# Patient Record
Sex: FEMALE | Race: WHITE | NOT HISPANIC OR LATINO | Employment: UNEMPLOYED | URBAN - METROPOLITAN AREA
[De-identification: names, ages, dates, MRNs, and addresses within clinical notes are randomized per-mention and may not be internally consistent; named-entity substitution may affect disease eponyms.]

---

## 2017-01-12 ENCOUNTER — TELEPHONE (OUTPATIENT)
Dept: DERMATOLOGY | Facility: CLINIC | Age: 82
End: 2017-01-12

## 2017-01-26 ENCOUNTER — INITIAL CONSULT (OUTPATIENT)
Dept: DERMATOLOGY | Facility: CLINIC | Age: 82
End: 2017-01-26
Payer: MEDICARE

## 2017-01-26 VITALS
BODY MASS INDEX: 27.48 KG/M2 | HEART RATE: 78 BPM | WEIGHT: 140 LBS | DIASTOLIC BLOOD PRESSURE: 78 MMHG | SYSTOLIC BLOOD PRESSURE: 140 MMHG | HEIGHT: 60 IN

## 2017-01-26 DIAGNOSIS — C44.619 BASAL CELL CARCINOMA, ARM, LEFT: ICD-10-CM

## 2017-01-26 DIAGNOSIS — C44.01 BASAL CELL CARCINOMA, LIP: Primary | ICD-10-CM

## 2017-01-26 PROCEDURE — 99999 PR PBB SHADOW E&M-EST. PATIENT-LVL III: CPT | Mod: PBBFAC,,, | Performed by: DERMATOLOGY

## 2017-01-26 PROCEDURE — 99213 OFFICE O/P EST LOW 20 MIN: CPT | Mod: PBBFAC | Performed by: DERMATOLOGY

## 2017-01-26 PROCEDURE — 99213 OFFICE O/P EST LOW 20 MIN: CPT | Mod: S$PBB,,, | Performed by: DERMATOLOGY

## 2017-01-26 NOTE — MR AVS SNAPSHOT
[facility] - Dermatology Surgery  [address]  [facility] [address]  Phone: [phone]  Fax: [phone]                  Miesha Acosta   2017 9:30 AM   Initial consult    Description:  Female : 1926   Provider:  Darian Aponte MD   Department:  [facility] - Dermatology Surgery           Reason for Visit     Basal Cell Carcinoma                To Do List           Goals (5 Years of Data)     None      [facility]     [facility] On [facility] Care Line -  Assistance  Registered nurses in the [facility] On Call Center provide clinical advisement, health education, appointment booking, and other advisory services.  Call for this free service at [phone].             Medications           Message regarding Medications     Verify the changes and/or additions to your medication regime listed below are the same as discussed with your clinician today.  If any of these changes or additions are incorrect, please notify your healthcare provider.             Verify that the below list of medications is an accurate representation of the medications you are currently taking.  If none reported, the list may be blank. If incorrect, please contact your healthcare provider. Carry this list with you in case of emergency.           Current Medications     atenolol (TENORMIN) 25 MG tablet Take 25 mg by mouth once daily.    biotin 300 mcg Tab Take 1 tablet by mouth once daily.    calcium citrate-vitamin D3 315-200 mg (CITRACAL+D) 315-200 mg-unit per tablet Take 1 tablet by mouth 2 (two) times daily.    ferrous sulfate 324 mg (65 mg iron) TbEC Take 325 mg by mouth once daily.    furosemide (LASIX) 20 MG tablet Take 20 mg by mouth once daily.    glucosamine-chondroitin 500-400 mg tablet Take 1 tablet by mouth once daily.    multivit-min-FA-lycopen-lutein (CENTRUM SILVER) 0.4-300-250 mg-mcg-mcg Tab Take by mouth.    pantoprazole (PROTONIX) 40 MG tablet Take 40 mg by mouth once daily.    rivaroxaban  (XARELTO) 20 mg Tab Take 10 mg by mouth once daily.    simvastatin (ZOCOR) 20 MG tablet Take 20 mg by mouth every evening.    vitamin A-vit C-vit E-zinc-Cu Tab Take by mouth.           Clinical Reference Information           Vital Signs - Last Recorded  Most recent update: 1/26/2017  9:38 AM by BENNY ESPINAL    BP Pulse Ht Wt BMI    (!) 140/78 (BP Location: Right arm, Patient Position: Sitting) 78 5' (1.524 m) 63.5 kg (140 lb) 27.34 kg/m2      Blood Pressure          Most Recent Value    BP  (!)  140/78      Allergies as of 1/26/2017     Quinine      Immunizations Administered on Date of Encounter - 1/26/2017     None      [facility] Sign-Up     Activating your [facility] account is as easy as 1-2-3!     1) Visit [email or web address], select Sign Up Now, enter this activation code and your date of birth, then select Next.  [email or web address]  Expires: 3/12/2017  9:51 AM      2) Create a username and password to use when you visit [facility] in the future and select a security question in case you lose your password and select Next.    3) Enter your e-mail address and click Sign Up!    Additional Information  If you have questions, please e-mail [email or web address] or call [phone] to talk to our [facility] staff. Remember, [facility] is NOT to be used for urgent needs. For medical emergencies, dial 911.

## 2017-01-26 NOTE — PROGRESS NOTES
ALLERGIES:  Quinine    CHIEF COMPLAINT:  This 89 y.o. female comes for evaluation for Mohs' Micrographic Surgery, Fresh Tissue Technique, for treatment of a biopsy-proven basal cell carcinoma on the left upper arm and of a biopsy-proven basal cell carcinoma on the left chin. Consultation requested by Gabby Wiley MD.    HISTORY OF PRESENT ILLNESS:   Location(s): left upper arm and left chin  Duration: two months or more  Quality: left arm lesion stings at times    Context: status post biopsies by Gabby Wiley MD; path = basal cell carcinoma on the left upper arm and basal cell carcinoma on the left chin; pathology accession #[id number], [facility]   Prior Treatment: none  See also the handwritten notes/diagrams scanned to chart for additional details.    Defibrillator: No  Pacemaker: No  Artificial heart valves: No  Artificial joints: No    REVIEW OF SYSTEMS:   General: general health fair  Skin: ha previous history of skin cancer(s); prior Mohs surgery  CV: has hypertension, has atrial fibrillation; no artificial valves  Endo: has no diabetes  Hem/Lymph: not taking prescribed anticoagulants  Allergy/Immuno: has allergies as noted above  GI: has no history of hepatitis  MS: as noted above     PAST MEDICAL HISTORY:  Past Medical History   Diagnosis Date    Allergy     Arthritis     Basal cell carcinoma     Hypertension     Squamous cell carcinoma        PAST SURGICAL HISTORY:  Past Surgical History   Procedure Laterality Date    Hysterectomy  1960    Breast lumpectomy  1970        SOCIAL HISTORY:  Dependencies: smoking status as noted below  Social History   Substance Use Topics    Smoking status: Unknown If Ever Smoked    Smokeless tobacco: None    Alcohol use No       PERTINENT MEDICATIONS:  See medications list.  Current Outpatient Prescriptions on File Prior to Visit   Medication Sig Dispense Refill    atenolol (TENORMIN) 25 MG tablet Take 25 mg by mouth once daily.      biotin 300 mcg  Tab Take 1 tablet by mouth once daily.      calcium citrate-vitamin D3 315-200 mg (CITRACAL+D) 315-200 mg-unit per tablet Take 1 tablet by mouth 2 (two) times daily.      ferrous sulfate 324 mg (65 mg iron) TbEC Take 325 mg by mouth once daily.      furosemide (LASIX) 20 MG tablet Take 20 mg by mouth once daily.      glucosamine-chondroitin 500-400 mg tablet Take 1 tablet by mouth once daily.      multivit-min-FA-lycopen-lutein (CENTRUM SILVER) 0.4-300-250 mg-mcg-mcg Tab Take by mouth.      pantoprazole (PROTONIX) 40 MG tablet Take 40 mg by mouth once daily.      rivaroxaban (XARELTO) 20 mg Tab Take 10 mg by mouth once daily.      simvastatin (ZOCOR) 20 MG tablet Take 20 mg by mouth every evening.      vitamin A-vit C-vit E-zinc-Cu Tab Take by mouth.       No current facility-administered medications on file prior to visit.        ALLERGIES:  Quinine    EXAM:  See also the handwritten notes/diagrams scanned to chart for additional details.  Constitutional  General appearance: well-developed, well-nourished, well-kempt older white female    Eyes  Inspection of conjunctivae and lids reveals no abnormalities; sclerae anicteric  Neurologic/Psychiatric  Alert,  normal orientation to time, place, person  Normal mood and affect with no evidence of depression, anxiety, agitation  Skin: see photo(s)  Head: background marked solar damage to exposed areas of skin; in addition, inspection/palpation reveals an ill-defined, approximately 6 mm pink plaque on the left lower lip which is consistent with a site of recent biopsy  Neck: examination reveals marked chronic solar damage  Left upper extremity: examination reveals an approximately 1 cm pink, somewhat scaly plaque on the left upper arm over the deltoid muscle    ASSESSMENT: biopsy-proven basal cell carcinoma of the left lower lip  biopsy-proven basal cell carcinoma of the left upper arm  chronic solar damage to areas as noted above  personal history of non-melanoma  skin cancer    PLAN:  The diagnosis and management options, and risks and benefits of the alternatives, including observation/non-treatment, radiation treatment, excision with vertical frozen section or paraffin-embedded section margin evaluation, and Mohs' Micrographic Surgery, Fresh Tissue Technique, were discussed at length with the patient. In particular, the discussion included, but was not limited to, the following:    One alternative at this point would be to defer further treatment and observe the lesion. With small skin cancers of this kind, it is possible that a biopsy can be sufficient to definitively treat a small skin cancer of this kind. Alternatively, some skin cancers are slow growing and do not require immediate treatment. The potential advantage of this choice would be to avoid the need for possibly unnecessary additional surgery. Among the potential disadvantages of this would be the possibility of enlargement of the lesion, more extensive spread of the lesion or recurrence at a later date, which might necessitate a larger and more complex surgery.    Radiation treatment can be an effective treatment for this type of skin cancer. The usual course of treatment is every weekday for several weeks. Local irritation will result from treatment, although no systemic side effects are expected. The potential advantage of radiation treatment is that it avoids the need for surgery. Among the disadvantages of radiation treatment are the length of treatment, the local inflammatory response, the absence of pathologic confirmation of the removal of the skin cancer, a possible increased risk of additional skin cancer in the treated area in later years, and a somewhat increased risk of recurrence at a later date.     Excisional surgery can be an effective treatment for this type of skin cancer. This would involve excision of the lesion with margin evaluation by submitting the specimen to a pathologist for either  immediate marginal assessment via frozen section processing, or delayed marginal assessment by fixed-tissue processing. The potential advantage of this technique is that it offers a way of treating the lesion with some degree of histologic confirmation of tumor removal. Among the disadvantages of this treatment are the possible need for re-excision if marginal involvement is identified, a somewhat greater likelihood of recurrence as compared to Mohs' surgery because of the less comprehensive margin evaluation inherent in the technique, and the general potential risks of surgery, including allergic reactions to the anesthetic and other materials used, infection, injury to nerves in the area with consequent loss of sensation or muscle function, and scarring or distortion of surrounding structures.    Mohs' surgery is a very effective treatment for this type of skin cancer. The potential advantage of Mohs' surgery is that this technique offers the greatest possible certainty of knowing that the skin cancer has been completely removed, with the removal of the least amount of normal tissue. The potential disadvantages of Mohs' surgery include the duration of the surgery, the possible need for a separate surgery for reconstruction following tumor removal, and scarring as a result. In addition, general potential risks of surgery as noted above also apply to treatment via Mohs' surgery.    Ms. Acosta is somewhat hesitant to proceed with treatment of the sites at present. After discussing options with the patient, we will defer further treatment to the sites and observe them for the present.    Sufficient time was available for questions, and all questions were answered to her satisfaction.     Discussion of the above issues constituted greater than 50% of the face-to-face encounter, the duration of which was 20 minutes.    An appointment will be made for her to follow-up for re-evaluation in 2 months. She is to call to  be seen sooner if any changes or signs of recurrence, which were reviewed, should arise in the meantime.    A consultation report will be sent to Dr. Wiley.    --------------------------------------  Note: some or all of this note may have been generated using voice recognition software and thus may contain grammatical and/or spelling errors.

## 2017-03-21 ENCOUNTER — TELEPHONE (OUTPATIENT)
Dept: DERMATOLOGY | Facility: CLINIC | Age: 82
End: 2017-03-21

## 2017-04-25 ENCOUNTER — TELEPHONE (OUTPATIENT)
Dept: DERMATOLOGY | Facility: CLINIC | Age: 82
End: 2017-04-25

## 2017-05-11 ENCOUNTER — OFFICE VISIT (OUTPATIENT)
Dept: DERMATOLOGY | Facility: CLINIC | Age: 82
End: 2017-05-11
Payer: MEDICARE

## 2017-05-11 DIAGNOSIS — C44.01 BASAL CELL CARCINOMA, LIP: Primary | ICD-10-CM

## 2017-05-11 DIAGNOSIS — I50.9 CONGESTIVE HEART FAILURE, UNSPECIFIED CONGESTIVE HEART FAILURE CHRONICITY, UNSPECIFIED CONGESTIVE HEART FAILURE TYPE: ICD-10-CM

## 2017-05-11 DIAGNOSIS — C44.619 BASAL CELL CARCINOMA OF LEFT UPPER ARM: ICD-10-CM

## 2017-05-11 PROCEDURE — 99999 PR PBB SHADOW E&M-EST. PATIENT-LVL II: CPT | Mod: PBBFAC,,, | Performed by: DERMATOLOGY

## 2017-05-11 PROCEDURE — 99214 OFFICE O/P EST MOD 30 MIN: CPT | Mod: S$PBB,,, | Performed by: DERMATOLOGY

## 2017-05-11 PROCEDURE — 99212 OFFICE O/P EST SF 10 MIN: CPT | Mod: PBBFAC | Performed by: DERMATOLOGY

## 2017-05-11 NOTE — PROGRESS NOTES
ALLERGIES:  Quinine    CHIEF COMPLAINT: followup basal cell carcinomas    The patient is accompanied to this visit by her aide.    HISTORY OF PRESENT ILLNESS:  Locations: left upper arm, left lower lip/chin  Timing: I last saw her 4 months ago    Context: Context: status post biopsies by Gabby Wiley MD; path = basal cell carcinoma on the left upper arm and basal cell carcinoma on the left chin; pathology accession #[id number], [facility]  Quality: unchanged    REVIEW OF SYSTEMS:   General: general health fair  CV: since I last saw her she has been hospitalized, apparently with heart failure    PAST MEDICAL HISTORY:  Past Medical History:   Diagnosis Date    Allergy     Arthritis     Basal cell carcinoma     Hypertension     Squamous cell carcinoma        PAST SURGICAL HISTORY:  Past Surgical History:   Procedure Laterality Date    BREAST LUMPECTOMY  1970    HYSTERECTOMY  1960        SOCIAL HISTORY:  Social History   Substance Use Topics    Smoking status: Unknown If Ever Smoked    Smokeless tobacco: None    Alcohol use No       PERTINENT MEDICATIONS:  See medications list.  Current Outpatient Prescriptions on File Prior to Visit   Medication Sig Dispense Refill    atenolol (TENORMIN) 25 MG tablet Take 25 mg by mouth once daily.      biotin 300 mcg Tab Take 1 tablet by mouth once daily.      calcium citrate-vitamin D3 315-200 mg (CITRACAL+D) 315-200 mg-unit per tablet Take 1 tablet by mouth 2 (two) times daily.      ferrous sulfate 324 mg (65 mg iron) TbEC Take 325 mg by mouth once daily.      furosemide (LASIX) 20 MG tablet Take 40 mg by mouth once daily.       glucosamine-chondroitin 500-400 mg tablet Take 1 tablet by mouth once daily.      multivit-min-FA-lycopen-lutein (CENTRUM SILVER) 0.4-300-250 mg-mcg-mcg Tab Take by mouth.      pantoprazole (PROTONIX) 40 MG tablet Take 40 mg by mouth once daily.      rivaroxaban (XARELTO) 20 mg Tab Take 10 mg by mouth once daily.       simvastatin (ZOCOR) 20 MG tablet Take 20 mg by mouth every evening.      vitamin A-vit C-vit E-zinc-Cu Tab Take by mouth.       No current facility-administered medications on file prior to visit.        EXAM:  Constitutional  General appearance: well-developed, well-nourished, well-kempt older white female    Eyes  Inspection of conjunctivae and lids reveals no abnormalities; sclerae anicteric  Neurologic/Psychiatric  Alert,  normal orientation to time, place, person  Normal mood and affect with no evidence of depression, anxiety, agitation  Skin: see prior photos  Head: background marked solar damage to exposed areas of skin; there is an approximately 6 mm area of pink papular changes and depressed scarring on her left lower lip at the site of previous biopsy, which was confirmed by reference to the photo taken at her visit in January  Neck: examination reveals marked chronic solar damage  Right upper extremity: examination reveals marked chronic solar damage  Left upper extremity: examination reveals marked chronic solar damage; on her left upper arm over the deltoid there is an approximately 1 cm pink plaque which is not noticeably changed since her last visit  Right lower extremity: Marked distal edema is present  Left lower extremity: Marked distal edema is present    ASSESSMENT:   biopsy-proven basal cell carcinoma, left lower lip; unchanged since her last visit  Biopsy-proven basal cell carcinoma, left upper arm, unchanged since her last visit  chronic solar damage to areas as noted above  personal history of non-melanoma skin cancer  Underlying congestive heart failure    PLAN:  The diagnoses and management options, and risks and benefits of the alternatives, including observation/non-treatment, radiation treatment, excision with vertical frozen section or paraffin-embedded section margin evaluation, and Mohs' Micrographic Surgery, Fresh Tissue Technique, were discussed at length with the patient. In  particular, the discussion included, but was not limited to, the following:    One alternative at this point would be to defer further treatment and observe the lesions. With small skin cancers of this kind, it is possible that a biopsy can be sufficient to definitively treat a small skin cancer of this kind. Alternatively, some skin cancers are slow growing and do not require immediate treatment. The potential advantage of this choice would be to avoid the need for possibly unnecessary additional surgery. Among the potential disadvantages of this would be the possibility of enlargement of the lesions, more extensive spread of the lesions or recurrence at a later date, which might necessitate larger and more complex surgeries.    Radiation treatment can be an effective treatment for these types of skin cancer. The usual course of treatment is every weekday for several weeks. Local irritation will result from treatment, although no systemic side effects are expected. The potential advantage of radiation treatment is that it avoids the need for surgery. Among the disadvantages of radiation treatment are the length of treatment, the local inflammatory response, the absence of pathologic confirmation of the removal of the skin cancer, a possible increased risk of additional skin cancer in the treated area in later years, and a somewhat increased risk of recurrence at a later date.     Excisional surgery can be an effective treatment for these types of skin cancer. This would involve excision of the lesions with margin evaluation by submitting the specimens to a pathologist for either immediate marginal assessment via frozen section processing, or delayed marginal assessment by fixed-tissue processing. The potential advantage of this technique is that it offers a way of treating the lesions with some degree of histologic confirmation of tumor removal. Among the disadvantages of this treatment are the possible need for  re-excision if marginal involvement is identified, a somewhat greater likelihood of recurrence as compared to Mohs' surgery because of the less comprehensive margin evaluation inherent in the technique, and the general potential risks of surgery, including allergic reactions to the anesthetic and other materials used, infection, injury to nerves in the area with consequent loss of sensation or muscle function, and scarring or distortion of surrounding structures.    Mohs' surgery is a very effective treatment for these types of skin cancer. The potential advantage of Mohs' surgery is that this technique offers the greatest possible certainty of knowing that the skin cancer has been completely removed, with the removal of the least amount of normal tissue. The potential disadvantages of Mohs' surgery include the duration of the surgery, the possible need for a separate surgery for reconstruction following tumor removal, and scarring as a result. In addition, general potential risks of surgery as noted above also apply to treatment via Mohs' surgery.    Sufficient time was available for questions, and all questions were answered to her satisfaction. She fully understands the aims, risks, benefits and alternatives for treatment.  However, she is unwilling to proceed with any treatment at this time given her other medical problems and the asymptomatic nature of these lesions.  After discussing the options, we will defer further treatment for the time being.  She is to follow-up with me in 3 months, or sooner in the event that they change in the meantime, for reassessment.    In addition, I had an extended discussion with her regarding possible causes of congestive heart failure and answering her questions regarding this for her.  Greater than 50% of a 25 minute face-to-face encounter was spent counseling the patient regarding the above, discussing the possible causes for and the pathophysiology of congestive heart  failure, and answering her questions in total.    --------------------------------------  Note: some or all of this note may have been generated using voice recognition software and thus may contain grammatical and/or spelling errors.

## 2017-07-24 ENCOUNTER — TELEPHONE (OUTPATIENT)
Dept: DERMATOLOGY | Facility: CLINIC | Age: 82
End: 2017-07-24

## 2017-07-24 NOTE — TELEPHONE ENCOUNTER
----- Message from Leigh Fontenot sent at 7/24/2017  2:27 PM CDT -----  Contact: Jolie, caretaker  Jolie is calling to cancel appt scheduled for 8/10/17 and can be reached at [phone].    Thank you

## 2017-07-24 NOTE — TELEPHONE ENCOUNTER
Spoke to Ms Dave, Ms Acosta's caregiver, and she stated that she would like to cancel Ms Acosta f/u appt scheduled on 8/10/17 and isn't interested in rescheduling because she has other health issues going on right now.  I informed Ms Dave that I will cancel the appt and if Ms Acosta has any problems or concerns to please call us. Ms Dave verbally understood.

## 2017-11-08 ENCOUNTER — TELEPHONE (OUTPATIENT)
Dept: DERMATOLOGY | Facility: CLINIC | Age: 82
End: 2017-11-08

## 2017-11-08 NOTE — TELEPHONE ENCOUNTER
----- Message from Darian Aponte MD sent at 10/23/2017  4:23 PM CDT -----  Tori,   Ask me please about Ms. Acosta. I probably need to see about getting her back in to see us for a follow-up visit.   Thanks.

## 2018-01-30 ENCOUNTER — TELEPHONE (OUTPATIENT)
Dept: DERMATOLOGY | Facility: CLINIC | Age: 83
End: 2018-01-30

## 2018-01-30 NOTE — TELEPHONE ENCOUNTER
----- Message from Bree May sent at 1/30/2018  2:14 PM CST -----  Contact: patient   Please call above patient needs to schedule an appointment

## 2018-02-01 ENCOUNTER — OFFICE VISIT (OUTPATIENT)
Dept: DERMATOLOGY | Facility: CLINIC | Age: 83
End: 2018-02-01
Payer: MEDICARE

## 2018-02-01 DIAGNOSIS — C44.41 BASAL CELL CARCINOMA OF NECK: Primary | ICD-10-CM

## 2018-02-01 DIAGNOSIS — D48.5 NEOPLASM OF UNCERTAIN BEHAVIOR OF SKIN: ICD-10-CM

## 2018-02-01 PROCEDURE — 99999 PR PBB SHADOW E&M-EST. PATIENT-LVL III: CPT | Mod: PBBFAC,,, | Performed by: DERMATOLOGY

## 2018-02-01 PROCEDURE — 99213 OFFICE O/P EST LOW 20 MIN: CPT | Mod: PBBFAC | Performed by: DERMATOLOGY

## 2018-02-01 PROCEDURE — 1159F MED LIST DOCD IN RCRD: CPT | Mod: ,,, | Performed by: DERMATOLOGY

## 2018-02-01 PROCEDURE — 1126F AMNT PAIN NOTED NONE PRSNT: CPT | Mod: ,,, | Performed by: DERMATOLOGY

## 2018-02-01 PROCEDURE — 99214 OFFICE O/P EST MOD 30 MIN: CPT | Mod: S$PBB,,, | Performed by: DERMATOLOGY

## 2018-02-01 NOTE — PROGRESS NOTES
ALLERGIES:  Quinine    CHIEF COMPLAINT: followup skin cancers    The patient is accompanied to this visit by her aide.    HISTORY OF PRESENT ILLNESS:  Locations: left upper arm, left lower lip   Context: see previous notes; I saw Ms. Acosta in consultation on 7/21/16 after referral by Dr. Jordyn Montenegro regarding possible treatment via Mohs' surgery of the squamous cell carcinoma on the right forehead and of a basal cell carcinoma right neck; pathology accession #[id number], [facility]; treatment was deferred in light of the absence of significant evidence of residual skin cancer at these sites; I saw her again on 1/26/2017 after referral by Gabby Wiley MD regarding possible treatment via Mohs' surgery of basal cell carcinomas on the left upper arm and left chin, pathology accession #[id number], [facility]; treatment was again deferred given the absence of significant evidence of residual skin cancer at the sites  She recently received a letter from Dr. Montenegro following up on the squamous cell carcinoma on the right forehead  Timing: I last saw her in May, 2017    REVIEW OF SYSTEMS:   General: general health fair to poor  Resp: has she apparently has bilateral pleural effusions, possibly secondary to heart failure, for which she has undergone repeated taps  Skin: she has scattered rough growths none particularly symptomatic    PAST MEDICAL HISTORY:  Past Medical History:   Diagnosis Date    Allergy     Arthritis     Basal cell carcinoma     Hypertension     Squamous cell carcinoma        PAST SURGICAL HISTORY:  Past Surgical History:   Procedure Laterality Date    BREAST LUMPECTOMY  1970    HYSTERECTOMY  1960        SOCIAL HISTORY:  Social History   Substance Use Topics    Smoking status: Unknown If Ever Smoked    Smokeless tobacco: Not on file    Alcohol use No       PERTINENT MEDICATIONS:  See medications list.  Current Outpatient Prescriptions on File Prior to Visit    Medication Sig Dispense Refill    atenolol (TENORMIN) 25 MG tablet Take 25 mg by mouth once daily.      biotin 300 mcg Tab Take 1 tablet by mouth once daily.      calcium citrate-vitamin D3 315-200 mg (CITRACAL+D) 315-200 mg-unit per tablet Take 1 tablet by mouth 2 (two) times daily.      ferrous sulfate 324 mg (65 mg iron) TbEC Take 325 mg by mouth once daily.      furosemide (LASIX) 20 MG tablet Take 40 mg by mouth once daily.       glucosamine-chondroitin 500-400 mg tablet Take 1 tablet by mouth once daily.      multivit-min-FA-lycopen-lutein (CENTRUM SILVER) 0.4-300-250 mg-mcg-mcg Tab Take by mouth.      pantoprazole (PROTONIX) 40 MG tablet Take 40 mg by mouth once daily.      rivaroxaban (XARELTO) 20 mg Tab Take 10 mg by mouth once daily.      simvastatin (ZOCOR) 20 MG tablet Take 20 mg by mouth every evening.      vitamin A-vit C-vit E-zinc-Cu Tab Take by mouth.       No current facility-administered medications on file prior to visit.                                EXAM:  See photos above  Constitutional  General appearance: well-developed, well-nourished, well-kempt older white female    Eyes  Inspection of conjunctivae and lids reveals no abnormalities; sclerae anicteric  Neurologic/Psychiatric  Alert,  normal orientation to time, place, person  Normal mood and affect with no evidence of depression, anxiety, agitation  Skin: see photo(s)  Face: there is a small eschar on her right nose; and a crusted nodule on her right chin, which are clinically suggestive of areas of basal cell carcinoma   There is no notable evidence of residual basal cell carcinoma to the previously-biopsied site on her left lower lip    There is some slight erythema and scale to the site of previous biopsy on her right forehead, but no notable evidence of residual squamous cell carcinoma  Neck: There is a pink, pearly tumor on the right neck overlying the sternocleidomastoid muscle which is consistent with recurrent nasal  cell carcinoma  Left arm: there is no evidence of residual basal cell carcinoma at the site of previous biopsy on her left deltoid region    ASSESSMENT:  Probable basal cell carcinoma, right chin, right nose  Probable recurrent basal cell carcinoma, right neck  No evidence of residual basal cell carcinoma, left lower lip  No evidence of residual basal cell carcinoma, left arm deltoid area  No evidence of residual invasive squamous cell carcinoma, right forehead    PLAN:    I again discussed with the patient the diagnoses and management options, and risks, benefits and alternatives.   We also discussed her current underlying medical problems, with her apparent heart failure and ongoing pleural effusions.  She apparently has not been given any expectation as to her short-term or long-term health.  After discussing the alternatives, at this point we will again defer treatment of the lesions present at this time.   She has agreed to follow-up with me in two months, or sooner in the event that any changes arise to the areas in the meantime.  Sufficient time was available for questions, and all questions were answered to her satisfaction.   Discussion of the above issues constituted greater than 50% of the face-to-face encounter, the duration of which was 30 minutes.  An appointment will be made for her to follow-up for re-evaluation in 2 months. She is to call to be seen sooner if any changes or signs of recurrence, which were reviewed, should arise in the meantime.  --------------------------------------  Note: Some or all of this note may have been generated using voice recognition software. There may be voice recognition errors including grammatical and/or spelling errors found in the text. Attempts were made to correct these errors prior to signature.

## 2018-04-05 ENCOUNTER — TELEPHONE (OUTPATIENT)
Dept: DERMATOLOGY | Facility: CLINIC | Age: 83
End: 2018-04-05

## 2018-05-11 ENCOUNTER — TELEPHONE (OUTPATIENT)
Dept: DERMATOLOGY | Facility: CLINIC | Age: 83
End: 2018-05-11

## 2018-05-11 NOTE — TELEPHONE ENCOUNTER
----- Message from Buck Pina sent at 5/11/2018  9:57 AM CDT -----  Contact: Patient   Patient Requesting Appointment.     Reason:requesting reschedule from missing 4/5 appt   Name of Caller: patient   Symptoms: spot check   Communication Preference: [phone]  Additional Information:

## 2018-05-15 ENCOUNTER — TELEPHONE (OUTPATIENT)
Dept: DERMATOLOGY | Facility: CLINIC | Age: 83
End: 2018-05-15

## 2018-05-15 NOTE — TELEPHONE ENCOUNTER
----- Message from Buck Pina sent at 5/15/2018  8:19 AM CDT -----  Contact: Patient   Pt requesting to reschedule appt with provider that was missed on 4/5/18. Please contact pt at [phone].

## 2018-05-28 ENCOUNTER — OFFICE VISIT (OUTPATIENT)
Dept: DERMATOLOGY | Facility: CLINIC | Age: 83
End: 2018-05-28
Payer: MEDICARE

## 2018-05-28 DIAGNOSIS — C44.529 SQUAMOUS CELL CARCINOMA OF TRUNK: ICD-10-CM

## 2018-05-28 DIAGNOSIS — C44.319 BASAL CELL CARCINOMA (BCC) OF CHIN: ICD-10-CM

## 2018-05-28 DIAGNOSIS — C44.01 BASAL CELL CARCINOMA, LIP: Primary | ICD-10-CM

## 2018-05-28 DIAGNOSIS — D48.5 NEOPLASM OF UNCERTAIN BEHAVIOR OF SKIN: ICD-10-CM

## 2018-05-28 DIAGNOSIS — C44.41 BASAL CELL CARCINOMA (BCC) OF RIGHT SIDE OF NECK: ICD-10-CM

## 2018-05-28 PROCEDURE — 99499 UNLISTED E&M SERVICE: CPT | Mod: S$PBB,,, | Performed by: DERMATOLOGY

## 2018-05-28 PROCEDURE — 88305 TISSUE EXAM BY PATHOLOGIST: CPT | Performed by: PATHOLOGY

## 2018-05-28 PROCEDURE — 99999 PR PBB SHADOW E&M-EST. PATIENT-LVL II: CPT | Mod: PBBFAC,,, | Performed by: DERMATOLOGY

## 2018-05-28 PROCEDURE — 17262 DSTRJ MAL LES T/A/L 1.1-2.0: CPT | Mod: S$PBB,,, | Performed by: DERMATOLOGY

## 2018-05-28 PROCEDURE — 99212 OFFICE O/P EST SF 10 MIN: CPT | Mod: PBBFAC | Performed by: DERMATOLOGY

## 2018-05-28 PROCEDURE — 17262 DSTRJ MAL LES T/A/L 1.1-2.0: CPT | Mod: PBBFAC | Performed by: DERMATOLOGY

## 2018-05-28 NOTE — PROGRESS NOTES
"ALLERGIES:  Quinine    CHIEF COMPLAINT: bothersome skin growth    HISTORY OF PRESENT ILLNESS:  Location: upper back   Duration: a couple of months or more  Quality: bothersome  Context: see the quote below from my last note in February:  " I saw Ms. Acosta in consultation on 7/21/16 after referral by Dr. Jordyn Montenegro regarding possible treatment via Mohs' surgery of the squamous cell carcinoma on the right forehead and of a basal cell carcinoma right neck; pathology accession #[id number], [facility]; treatment was deferred in light of the absence of significant evidence of residual skin cancer at these sites; I saw her again on 1/26/2017 after referral by Gabby Wiley MD regarding possible treatment via Mohs' surgery of basal cell carcinomas on the left upper arm and left chin, pathology accession #[id number], [facility]; treatment was again deferred given the absence of significant evidence of residual skin cancer at the sites"    REVIEW OF SYSTEMS:  General: general health good  Skin: see previous notes  CV: has no pacemaker/no defibrillator  Apparently has bilateral heart failure; now has right and left indwelling catheters in her pleural spaces; undergoes daily drainage      PAST MEDICAL HISTORY:  Past Medical History:   Diagnosis Date    Allergy     Arthritis     Basal cell carcinoma     Hypertension     Squamous cell carcinoma        PAST SURGICAL HISTORY:  Past Surgical History:   Procedure Laterality Date    BREAST LUMPECTOMY  1970    HYSTERECTOMY  1960        SOCIAL HISTORY:  Social History   Substance Use Topics    Smoking status: Unknown If Ever Smoked    Smokeless tobacco: Not on file    Alcohol use No       PERTINENT MEDICATIONS:    Current Outpatient Prescriptions:     atenolol (TENORMIN) 25 MG tablet, Take 25 mg by mouth once daily., Disp: , Rfl:     biotin 300 mcg Tab, Take 1 tablet by mouth once daily., Disp: , Rfl:     calcium citrate-vitamin D3 " 315-200 mg (CITRACAL+D) 315-200 mg-unit per tablet, Take 1 tablet by mouth 2 (two) times daily., Disp: , Rfl:     ferrous sulfate 324 mg (65 mg iron) TbEC, Take 325 mg by mouth once daily., Disp: , Rfl:     furosemide (LASIX) 20 MG tablet, Take 40 mg by mouth once daily. , Disp: , Rfl:     glucosamine-chondroitin 500-400 mg tablet, Take 1 tablet by mouth once daily., Disp: , Rfl:     multivit-min-FA-lycopen-lutein (CENTRUM SILVER) 0.4-300-250 mg-mcg-mcg Tab, Take by mouth., Disp: , Rfl:     pantoprazole (PROTONIX) 40 MG tablet, Take 40 mg by mouth once daily., Disp: , Rfl:     rivaroxaban (XARELTO) 20 mg Tab, Take 10 mg by mouth once daily., Disp: , Rfl:     simvastatin (ZOCOR) 20 MG tablet, Take 20 mg by mouth every evening., Disp: , Rfl:     vitamin A-vit C-vit E-zinc-Cu Tab, Take by mouth., Disp: , Rfl:     ALLERGIES:  Quinine    EXAM:  Constitutional  - General appearance: well-developed, well-nourished, well-kempt elderly white female    Eyes  - Conjunctivae and lids - no abnormalities; sclerae anicteric  Neurologic/Psychiatric  - Orientation - Alert,  normal orientation to time, place, person  - Mood and affect - Alivia mood and affect with no evidence of depression, anxiety, agitation  Skin: see photo(s) below  - Head/Face: marked chronic solar damage; there are persistent nodules on the right chin and left upper lip/lateral commissure; see prior notes   - Neck: marked  chronic solar damage; there is a persistent nodule on her right neck; see prior notes  - Right Upper Extremity: marked chronic solar damage  - Left Upper Extremity: marked chronic solar damage; there is a well-healed scar with no evidence of basal cell carcinoma on the left deltoid area  - Back: there is a 1.5 cm somewhat crateriform tumor to her upper back; see photo;                           ASSESSMENT:   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  Will proceed with biopsy and destruction of upper back lesion  Also discussed options for the right neck, right chin, and left lip lesion, including Mohs surgery  See procedure note(s) below.   PROCEDURE NOTE: SHAVE BIOPSY AND DESTRUCTION    The diagnosis and management options and risk, benefits and alternatives were discussed with the patient. All questions were answered. Verbal consent was obtained.    Site: upper back  Indication: clinically suspicious lesion; suggestive of malignancy  Size: 1.5 cm  Prep: Alcohol  Anesthesia: 1% lidocaine plain, less than 2 mL  Shave biopsy performed  Electrodesiccation and curettage carried out for destruction, 3 repetitions  Hemostasis with electrodesiccation  Dressed with petrolatum and bandaid  Specimen placed in formalin to be submitted to pathology    Routine care instructions given  Followup: 2 weeks to discuss path and coordinate further care  --------------------------------------  Note: Some or all of this note may have been generated using voice recognition software. There may be voice recognition errors including grammatical and/or spelling errors found in the text. Attempts were made to correct these errors prior to signature. .

## 2018-06-08 ENCOUNTER — TELEPHONE (OUTPATIENT)
Dept: DERMATOLOGY | Facility: CLINIC | Age: 83
End: 2018-06-08

## 2018-06-11 ENCOUNTER — OFFICE VISIT (OUTPATIENT)
Dept: DERMATOLOGY | Facility: CLINIC | Age: 83
End: 2018-06-11
Payer: MEDICARE

## 2018-06-11 DIAGNOSIS — C44.319 BASAL CELL CARCINOMA (BCC) OF CHIN: Primary | ICD-10-CM

## 2018-06-11 DIAGNOSIS — C44.41 BASAL CELL CARCINOMA OF NECK: ICD-10-CM

## 2018-06-11 PROCEDURE — 99213 OFFICE O/P EST LOW 20 MIN: CPT | Mod: PBBFAC | Performed by: DERMATOLOGY

## 2018-06-11 PROCEDURE — 99214 OFFICE O/P EST MOD 30 MIN: CPT | Mod: 24,S$PBB,, | Performed by: DERMATOLOGY

## 2018-06-11 PROCEDURE — 99999 PR PBB SHADOW E&M-EST. PATIENT-LVL III: CPT | Mod: PBBFAC,,, | Performed by: DERMATOLOGY

## 2018-06-11 NOTE — PROGRESS NOTES
ALLERGIES:  Quinine    CHIEF COMPLAINT: followup post biopsy and destruction    HISTORY OF PRESENT ILLNESS:  Location: upper back  Timing: one week ago   Context: pathology showed keratoacanthoma-type squamous cell carcinoma; see pathology report in chart  Quality: somewhat tender    FINAL PATHOLOGIC DIAGNOSIS  1. Skin, upper back, shave biopsy:  - INVASIVE SQUAMOUS CELL CARCINOMA, WELL-DIFFERENTIATED, CRATERIFORM  (KERATOACANTHOMATOUS TYPE).  - THE TUMOR EXTENDS TO THE DEEP BIOPSY MARGIN.  MICROSCOPIC DESCRIPTION: Sections show a crateriform squamous proliferation exhibiting minimal atypia.  Diagnosed by: Jerald Vivar M.D.  (Electronically Signed: 2018-06-04 13:25:53)    REVIEW OF SYSTEMS:   General: general health fair  Skin: still pending treatment of basal cell carcinomas on the right neck and right chin; see previous notes; these are now symptomatic  CV: has no pacemaker, no defibrillator, no artificial valves; on Xarelto  Resp: has bilateral chest tubes and pleural effusions; gets drained about every other day  Hem/Lymph: taking prescribed anticoagulants as noted above  Allergy/Immuno: has allergies as noted above    PAST MEDICAL HISTORY:  Past Medical History:   Diagnosis Date    Allergy     Arthritis     Basal cell carcinoma     Hypertension     Squamous cell carcinoma        PAST SURGICAL HISTORY:  Past Surgical History:   Procedure Laterality Date    BREAST LUMPECTOMY  1970    HYSTERECTOMY  1960       SOCIAL HISTORY:  Social History   Substance Use Topics    Smoking status: Unknown If Ever Smoked    Smokeless tobacco: Not on file    Alcohol use No        PERTINENT MEDICATIONS:  See medications list.    Current Outpatient Prescriptions:     acetaminophen (TYLENOL) 500 MG tablet, Take 500 mg by mouth every 6 (six) hours as needed for Pain., Disp: , Rfl:     atenolol (TENORMIN) 25 MG tablet, Take 25 mg by mouth once daily., Disp: , Rfl:     azithromycin (Z-PAULINE) 250 MG tablet, Take 250 mg by  mouth once daily., Disp: , Rfl:     biotin 5,000 mcg TbDL, Take 1 tablet by mouth once daily., Disp: , Rfl:     calcium citrate-vitamin D3 315-200 mg (CITRACAL+D) 315-200 mg-unit per tablet, Take 1 tablet by mouth 2 (two) times daily., Disp: , Rfl:     cyanocobalamin (VITAMIN B-12) 1000 MCG tablet, Take 100 mcg by mouth once daily., Disp: , Rfl:     ferrous sulfate 324 mg (65 mg iron) TbEC, Take 325 mg by mouth once daily., Disp: , Rfl:     furosemide (LASIX) 20 MG tablet, Take 60 mg by mouth once daily. , Disp: , Rfl:     multivit-min-FA-lycopen-lutein (CENTRUM SILVER) 0.4-300-250 mg-mcg-mcg Tab, Take by mouth., Disp: , Rfl:     pantoprazole (PROTONIX) 40 MG tablet, Take 40 mg by mouth once daily., Disp: , Rfl:     rifAMpin (RIFADIN) 300 MG capsule, Take 300 mg by mouth once daily., Disp: , Rfl:     rivaroxaban (XARELTO) 20 mg Tab, Take 15 mg by mouth once daily. , Disp: , Rfl:     simvastatin (ZOCOR) 20 MG tablet, Take 20 mg by mouth every evening., Disp: , Rfl:     spironolactone (ALDACTONE) 25 MG tablet, Take 25 mg by mouth once daily., Disp: , Rfl:     traMADol (ULTRAM) 50 mg tablet, Take 50 mg by mouth every 6 (six) hours as needed for Pain., Disp: , Rfl:     vit C,Y-Qx-rnrfg-lutein-zeaxan (PRESERVISION AREDS 2) 557-538-71-1 mg-unit-mg-mg Cap, Take by mouth., Disp: , Rfl:     vitamin A-vit C-vit E-zinc-Cu Tab, Take by mouth., Disp: , Rfl:     EXAM:  Constitutional  General appearance: well-developed, well-nourished, well-kempt older white female    Eyes  Inspection of conjunctivae and lids reveals no abnormalities; sclerae anicteric  Neurologic/Psychiatric  Alert,  normal orientation to time, place, person  Normal mood andaffect with no evidence of depression, anxiety, agitation  Skin: see photo(s) from previous visits  Head: background marked solar damage to exposed areas of skin; in addition, inspection/palpation reveals an approximately 9-10 mm pearly, crusted tumor on the right chin which  feels freely movable over the underlying tissues on palpation  Neck: on her right neck inferior/posterior to the earlobe there is an approximately 9mm pearly, crusted tumor   Back: there is an approximately 1 cm ulceration to the site of recent treatment on her upper back    ASSESSMENT:   status post biopsy and destruction, keratoacanthoma-type squamous cell carcinoma, upper back  basal cell carcinoma on right chin and right neck, pending treatment  chronic solar damage to areas as noted above  Long term current use of anticoagulant    PLAN:    The diagnosis of the keratoacanthoma-type squamous cell carcinoma on her back was discussed. The lesion was treated by electrodesiccation and curettage at the time of the biopsy. Under the circumstances, I feel that this is likely to have been definitive treatment for this lesion. We discussed care. I will observe it for now.    The diagnoses of the basal cell carcinomas on the right neck and right chin and management options, and risks and benefits of the alternatives, including observation/non-treatment, radiation treatment, excision with vertical frozen section or paraffin-embedded section margin evaluation, and Mohs' Micrographic Surgery, Fresh Tissue Technique, were discussed at length with the patient. In particular, the discussion included, but was not limited to, the following:    One alternative at this point would be to defer further treatment and observe the lesions. With small skin cancers of this kind, it is possible that a biopsy can be sufficient to definitively treat a small skin cancer of this kind. Alternatively, some skin cancers are slow growing and do not require immediate treatment. The potential advantage of this choice would be to avoid the need for possibly unnecessary additional surgery. Among the potential disadvantages of this would be the possibility of enlargement of the lesions, more extensive spread of the lesions or recurrence at a later date,  which might necessitate larger and more complex surgeries.    Radiation treatment can be an effective treatment for these types of skin cancer. The usual course of treatment is every weekday for several weeks. Local irritation will result from treatment, although no systemic side effects are expected. The potential advantage of radiation treatment is that it avoids the need for surgery. Among the disadvantages of radiation treatment are the length of treatment, the local inflammatory response, the absence of pathologic confirmation of the removal of the skin cancer, a possible increased risk of additional skin cancer in the treated area in later years, and a somewhat increased risk of recurrence at a later date.     Excisional surgery can be an effective treatment for these types of skin cancer. This would involve excision of the lesions with margin evaluation by submitting the specimens to a pathologist for either immediate marginal assessment via frozen section processing, or delayed marginal assessment by fixed-tissue processing. The potential advantage of this technique is that it offers a way of treating the lesions with some degree of histologic confirmation of tumor removal. Among the disadvantages of this treatment are the possible need for re-excision if marginal involvement is identified, a somewhat greater likelihood of recurrence as compared to Mohs' surgery because of the less comprehensive margin evaluation inherent in the technique, and the general potential risks of surgery, including allergic reactions to the anesthetic and other materials used, infection, injury to nerves in the area with consequent loss of sensation or muscle function, and scarring or distortion of surrounding structures.    Mohs' surgery is a very effective treatment for these types of skin cancer. The potential advantage of Mohs' surgery is that this technique offers the greatest possible certainty of knowing that the skin  cancer has been completely removed, with the removal of the least amount of normal tissue. The potential disadvantages of Mohs' surgery include the duration of the surgery, the possible need for a separate surgery for reconstruction following tumor removal, and scarring as a result. In addition, general potential risks of surgery as noted above also apply to treatment via Mohs' surgery.    In light of the nature of these tumors and the locations in areas of increased risk of recurrence, Mohs' micrographic surgery was thought to be the most appropriate management choice, and these diagnoses are appropriate for treatment by Mohs' micrographic surgery.     Sufficient time was available for questions, and all questions were answered to her satisfaction. She fully understands the aims, risks, alternatives, and possible complications, and has elected to proceed with the surgery, and verbally consented to do so. The procedure will be scheduled in the near future. We will coordinate with the home health nurse to try to schedule her surgery on a day that will not interfere with the draining of her pleural effusions.    Discussion of the above issues constituted greater than 50% of the face-to-face encounter, the duration of which was 30 minutes.     Routine pre-op instructions were given to her.    The patient was instructed to continue Eliquis prior to surgery.    --------------------------------------  Note: Some or all of this note may have been generated using voice recognition software. There may be voice recognition errors including grammatical and/or spelling errors found in the text. Attempts were made to correct these errors prior to signature.

## 2018-06-12 ENCOUNTER — TELEPHONE (OUTPATIENT)
Dept: DERMATOLOGY | Facility: CLINIC | Age: 83
End: 2018-06-12

## 2018-06-12 RX ORDER — TRAMADOL HYDROCHLORIDE 50 MG/1
50 TABLET ORAL EVERY 6 HOURS PRN
COMMUNITY

## 2018-06-12 RX ORDER — LANOLIN ALCOHOL/MO/W.PET/CERES
100 CREAM (GRAM) TOPICAL DAILY
COMMUNITY

## 2018-06-12 RX ORDER — AZITHROMYCIN 250 MG/1
250 TABLET, FILM COATED ORAL DAILY
COMMUNITY

## 2018-06-12 RX ORDER — RIFAMPIN 300 MG/1
300 CAPSULE ORAL DAILY
COMMUNITY

## 2018-06-12 RX ORDER — ACETAMINOPHEN 500 MG
500 TABLET ORAL EVERY 6 HOURS PRN
COMMUNITY

## 2018-06-12 RX ORDER — SPIRONOLACTONE 25 MG/1
25 TABLET ORAL DAILY
COMMUNITY

## 2018-06-12 NOTE — TELEPHONE ENCOUNTER
----- Message from Bree Murphy sent at 6/12/2018 12:56 PM CDT -----  Contact: self  Patient [phone] is asking to please call her home and talk to the person that answers the phone to schedule appt or procedure/patient was at a luncheon and did not have any information in front of her but whoever answers at home will be able to help nurse/please call

## 2018-06-25 ENCOUNTER — TELEPHONE (OUTPATIENT)
Dept: DERMATOLOGY | Facility: CLINIC | Age: 83
End: 2018-06-25

## 2018-06-25 NOTE — TELEPHONE ENCOUNTER
spoke to patient's helper and rescheduled surgery. will check with Dr to see if can offer later time in the morning.

## 2018-07-16 ENCOUNTER — TELEPHONE (OUTPATIENT)
Dept: DERMATOLOGY | Facility: CLINIC | Age: 83
End: 2018-07-16

## 2018-07-16 ENCOUNTER — PROCEDURE VISIT (OUTPATIENT)
Dept: DERMATOLOGY | Facility: CLINIC | Age: 83
End: 2018-07-16
Payer: MEDICARE

## 2018-07-16 DIAGNOSIS — C44.319 BASAL CELL CARCINOMA (BCC) OF CHIN: Primary | ICD-10-CM

## 2018-07-16 DIAGNOSIS — J90 PLEURAL EFFUSION: ICD-10-CM

## 2018-07-16 DIAGNOSIS — C44.41 BASAL CELL CARCINOMA OF NECK: ICD-10-CM

## 2018-07-16 NOTE — TELEPHONE ENCOUNTER
----- Message from Marisol Saleem sent at 7/16/2018  8:20 AM CDT -----  Contact: patient   Needs Advice    Reason for call: Please call patient in ref to medication patient should take prior to her appt this morning 07/16        Communication Preference: [phone]

## 2018-07-16 NOTE — TELEPHONE ENCOUNTER
----- Message from Marisol Saleem sent at 7/16/2018  8:20 AM CDT -----  Contact: patient   Needs Advice    Reason for call: Please call patient in ref to medication patient should take prior to her appt this morning 07/16        Communication Preference: [phone]

## 2018-07-16 NOTE — PROGRESS NOTES
Allergies:   Review of patient's allergies indicates:   Allergen Reactions    Quinine        Chief Complaint: here for Mohs' surgery to basal cell carcinoma on the right neck and right chin    HPI:   Location: right neck, right chin  Quality: unchanged  Timing: I last saw her 4-5 weeks ago  Context: prior biopsy showed basal cell carcinoma on the neck, the right chin lesion is clinically typical in appearance for a basal cell carcinoma although it has not as yet been biopsied  She has some reservations about the surgery, as she has recently been having drainage of her pleural effusions more frequently, now up to every other day     Review of Systems:  Resp: see context above  Skin: also status post biopsy and 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 on 5/28    Medications: see list    Current Outpatient Prescriptions:     acetaminophen (TYLENOL) 500 MG tablet, Take 500 mg by mouth every 6 (six) hours as needed for Pain., Disp: , Rfl:     atenolol (TENORMIN) 25 MG tablet, Take 25 mg by mouth once daily., Disp: , Rfl:     azithromycin (Z-PAULINE) 250 MG tablet, Take 250 mg by mouth once daily., Disp: , Rfl:     biotin 5,000 mcg TbDL, Take 1 tablet by mouth once daily., Disp: , Rfl:     calcium citrate-vitamin D3 315-200 mg (CITRACAL+D) 315-200 mg-unit per tablet, Take 1 tablet by mouth 2 (two) times daily., Disp: , Rfl:     cyanocobalamin (VITAMIN B-12) 1000 MCG tablet, Take 100 mcg by mouth once daily., Disp: , Rfl:     ferrous sulfate 324 mg (65 mg iron) TbEC, Take 325 mg by mouth once daily., Disp: , Rfl:     furosemide (LASIX) 20 MG tablet, Take 60 mg by mouth once daily. , Disp: , Rfl:     multivit-min-FA-lycopen-lutein (CENTRUM SILVER) 0.4-300-250 mg-mcg-mcg Tab, Take by mouth., Disp: , Rfl:     pantoprazole (PROTONIX) 40 MG tablet, Take 40 mg by mouth once daily., Disp: , Rfl:     rifAMpin (RIFADIN) 300 MG capsule, Take 300 mg by mouth once daily., Disp: , Rfl:      rivaroxaban (XARELTO) 20 mg Tab, Take 15 mg by mouth once daily. , Disp: , Rfl:     simvastatin (ZOCOR) 20 MG tablet, Take 20 mg by mouth every evening., Disp: , Rfl:     spironolactone (ALDACTONE) 25 MG tablet, Take 25 mg by mouth once daily., Disp: , Rfl:     traMADol (ULTRAM) 50 mg tablet, Take 50 mg by mouth every 6 (six) hours as needed for Pain., Disp: , Rfl:     vit C,Q-Nn-qwfgu-lutein-zeaxan (PRESERVISION AREDS 2) 026-142-63-1 mg-unit-mg-mg Cap, Take by mouth., Disp: , Rfl:     vitamin A-vit C-vit E-zinc-Cu Tab, Take by mouth., Disp: , Rfl:     Review of patient's allergies indicates:   Allergen Reactions    Quinine        Past Medical History:   Diagnosis Date    Allergy     Arthritis     Basal cell carcinoma     Hypertension     Squamous cell carcinoma        Past Surgical History:   Procedure Laterality Date    BREAST LUMPECTOMY  1970    HYSTERECTOMY  1960       Social History     Social History    Marital status:      Spouse name: N/A    Number of children: N/A    Years of education: N/A     Occupational History    Not on file.     Social History Main Topics    Smoking status: Unknown If Ever Smoked    Smokeless tobacco: Not on file    Alcohol use No    Drug use: No    Sexual activity: Not on file     Other Topics Concern    Are You Pregnant Or Think You May Be? No    Breast-Feeding No     Social History Narrative    No narrative on file       There were no vitals filed for this visit.    EXAM:  Constitutional  - General appearance: well-developed, well-nourished, well-kempt older white female    Eyes  - Conjunctivae and lids - no abnormalities; sclerae anicteric  Neurologic/Psychiatric  - Orientation - Alert,  normal orientation to time, place, person  - Mood and affect - Normal mood and affect with no evidence of depression, anxiety, agitation; speech is normal  Skin:   - Head/Face: there is an approximately 1 cm pearly nodule on her right chin at the site previously  noted  - Neck: there is an approximately 1 cm crusted, pearly nodule on her right neck at the site previously noted  These show no real change since her recent visit  - Back: there is an approximately 1 cm somewhat indurated, smooth scar on her mid upper back at the site of recent biopsy/destruction    Assessment:  basal cell carcinoma of the right neck and right chin, pending treatment  Bilateral pleural effusions    Plan:  I discussed with Ms. Acosta the diagnoses and management options, and risks, benefits and alternatives. This discussion included but was not limited to the following:    I reviewed with her the nature of the basal cell carcinomas on her right chin and right neck. I explained that this is not an issue which requires immediate/urgent treatment.     We discussed her pleural effusions and the current management, and the recent increase in frequency of drainage recommended by her internist.     She has not been offered any long term prognosis regarding the effusions and the possibility of worsening or more complex treatment.    I discussed with her the alternatives of ongoing observation versus treatment via electrodesiccation and curettage versus proceeding with Mohs surgery, as had been scheduled today.     All questions were answered to her satisfaction.    Greater than 50% of a 15 minute face-to-face encounter was spent on the above counseling.    Under the circumstances, including her issues with her pleural effusions, and after discussion of the risks and benefits of the alternatives with her, she has elected to defer treatment to these lesions, which seems reasonable at this time.    We will schedule her for a follow up visit in 2 months; she is to call PRN sooner if any changes arise to these lesions.

## 2018-09-14 ENCOUNTER — TELEPHONE (OUTPATIENT)
Dept: DERMATOLOGY | Facility: CLINIC | Age: 83
End: 2018-09-14

## 2018-09-14 NOTE — TELEPHONE ENCOUNTER
----- Message from Mindy Schwab sent at 9/14/2018  9:49 AM CDT -----  Contact: pts caretaker Jolie batista [phone]  [facility] pt-Pt had surgery in the past and now has more susp spots.

## 2018-09-20 ENCOUNTER — TELEPHONE (OUTPATIENT)
Dept: DERMATOLOGY | Facility: CLINIC | Age: 83
End: 2018-09-20

## 2018-09-20 NOTE — TELEPHONE ENCOUNTER
returned patient's call. canceled f/u appt per her request. health is deteriorating and patient will call back for an appointment if needed.